# Patient Record
Sex: FEMALE | Race: WHITE | NOT HISPANIC OR LATINO | Employment: FULL TIME | URBAN - METROPOLITAN AREA
[De-identification: names, ages, dates, MRNs, and addresses within clinical notes are randomized per-mention and may not be internally consistent; named-entity substitution may affect disease eponyms.]

---

## 2017-01-18 DIAGNOSIS — E78.2 MIXED HYPERLIPIDEMIA: ICD-10-CM

## 2017-01-18 DIAGNOSIS — I10 ESSENTIAL HYPERTENSION: Primary | ICD-10-CM

## 2017-02-01 ENCOUNTER — OFFICE VISIT (OUTPATIENT)
Dept: FAMILY MEDICINE CLINIC | Facility: CLINIC | Age: 37
End: 2017-02-01

## 2017-02-01 VITALS
HEART RATE: 80 BPM | TEMPERATURE: 98.3 F | OXYGEN SATURATION: 99 % | SYSTOLIC BLOOD PRESSURE: 120 MMHG | BODY MASS INDEX: 30.3 KG/M2 | DIASTOLIC BLOOD PRESSURE: 86 MMHG | HEIGHT: 64 IN | WEIGHT: 177.5 LBS

## 2017-02-01 DIAGNOSIS — K21.9 GASTROESOPHAGEAL REFLUX DISEASE, ESOPHAGITIS PRESENCE NOT SPECIFIED: ICD-10-CM

## 2017-02-01 DIAGNOSIS — I10 BENIGN ESSENTIAL HYPERTENSION: ICD-10-CM

## 2017-02-01 DIAGNOSIS — E66.9 NON MORBID OBESITY, UNSPECIFIED OBESITY TYPE: ICD-10-CM

## 2017-02-01 DIAGNOSIS — I10 BENIGN ESSENTIAL HYPERTENSION: Primary | ICD-10-CM

## 2017-02-01 DIAGNOSIS — G43.809 OTHER TYPE OF MIGRAINE: ICD-10-CM

## 2017-02-01 PROCEDURE — 99214 OFFICE O/P EST MOD 30 MIN: CPT | Performed by: FAMILY MEDICINE

## 2017-02-01 RX ORDER — RIZATRIPTAN BENZOATE 5 MG/1
5 TABLET, ORALLY DISINTEGRATING ORAL ONCE AS NEEDED
Qty: 9 TABLET | Refills: 5 | Status: SHIPPED | OUTPATIENT
Start: 2017-02-01

## 2017-02-01 RX ORDER — HYDROCHLOROTHIAZIDE 12.5 MG/1
12.5 TABLET ORAL DAILY
Qty: 90 TABLET | Refills: 1 | Status: SHIPPED | OUTPATIENT
Start: 2017-02-01 | End: 2017-09-27

## 2017-02-01 NOTE — PROGRESS NOTES
Subjective   Rachael Boyce is a 36 y.o. female.   Chief Complaint   Patient presents with   • Hypertension   • Heartburn   • Migraine       History of Present Illness     #1 hypertension- patient is on HCTZ 12.5 mg a day. She takes it everyday. She reports no side effects. She does not exercise regularly.  She gained 9 pounds from last office visit.  No chest pain, shortness of breath, lower extremity edema, dizziness.    #2 migraine headaches- on  Maxalt as needed.  She was previously on Topamax, but she discontinued it as it made her feel foggy, it affected her memory and spelling.  She reports no change in character or intensity of migraines.  She gets 1-3 migraines a month.  Maxalt helps. No SE.    #3 GERD-patient is on Zantac.  She alternates it with Prevacid.  It controls her symptoms.    The following portions of the patient's history were reviewed and updated as appropriate: allergies, current medications, past medical history, past social history and problem list.    Review of Systems   Constitutional: Negative.    Respiratory: Negative.    Cardiovascular: Negative.          Objective   Wt Readings from Last 3 Encounters:   02/01/17 177 lb 8 oz (80.5 kg)   07/11/16 168 lb (76.2 kg)   03/28/16 169 lb (76.7 kg)      Vitals:    02/01/17 0857   BP: 120/86   Pulse: 80   Temp: 98.3 °F (36.8 °C)   SpO2: 99%     Temp Readings from Last 3 Encounters:   02/01/17 98.3 °F (36.8 °C)   07/11/16 98.2 °F (36.8 °C)   03/28/16 98.5 °F (36.9 °C)     BP Readings from Last 3 Encounters:   02/01/17 120/86   07/11/16 132/70   03/28/16 120/82     Pulse Readings from Last 3 Encounters:   02/01/17 80   07/11/16 83   03/28/16 85       Physical Exam   Constitutional: She is oriented to person, place, and time. She appears well-developed and well-nourished.   HENT:   Head: Normocephalic and atraumatic.   Neck: Neck supple. Carotid bruit is not present. No thyromegaly present.   Cardiovascular: Normal rate, regular rhythm and normal  heart sounds.    Pulmonary/Chest: Effort normal and breath sounds normal.   Neurological: She is alert and oriented to person, place, and time.   Skin: Skin is warm, dry and intact.   Psychiatric: She has a normal mood and affect. Her behavior is normal.       Assessment/Plan   Rachael was seen today for hypertension, heartburn and migraine.    Diagnoses and all orders for this visit:    Benign essential hypertension  -     Thyroid Cascade Profile; Future  -     Vitamin D 25 Hydroxy; Future    Other type of migraine  -     Thyroid Cascade Profile; Future  -     Vitamin D 25 Hydroxy; Future    Gastroesophageal reflux disease, esophagitis presence not specified    Non morbid obesity, unspecified obesity type  -     Thyroid Cascade Profile; Future  -     Vitamin D 25 Hydroxy; Future    Other orders  -     rizatriptan MLT (MAXALT-MLT) 5 MG disintegrating tablet; Take 1 tablet by mouth 1 (One) Time As Needed for migraine for up to 1 dose. May repeat in 2 hours if needed  -     hydrochlorothiazide (HYDRODIURIL) 12.5 MG tablet; Take 1 tablet by mouth Daily.        #1 hypertension-controlled.  Continue current treatment.  Check labs today.  Start exercise at least 30 minutes a day, 5 days a week.  Follow-up in 6 months.      #2 migraine headaches-continue current treatment.  Follow-up in 6 months, or sooner if problems.    #3 GERD-controlled.

## 2017-02-02 LAB
25(OH)D3+25(OH)D2 SERPL-MCNC: 15.5 NG/ML (ref 30–100)
ALBUMIN SERPL-MCNC: 4.5 G/DL (ref 3.5–5.2)
ALBUMIN/GLOB SERPL: 1.7 G/DL
ALP SERPL-CCNC: 57 U/L (ref 39–117)
ALT SERPL-CCNC: 34 U/L (ref 1–33)
AST SERPL-CCNC: 26 U/L (ref 1–32)
BILIRUB SERPL-MCNC: 0.3 MG/DL (ref 0.1–1.2)
BUN SERPL-MCNC: 17 MG/DL (ref 6–20)
BUN/CREAT SERPL: 18.1 (ref 7–25)
CALCIUM SERPL-MCNC: 9.9 MG/DL (ref 8.6–10.5)
CHLORIDE SERPL-SCNC: 101 MMOL/L (ref 98–107)
CHOLEST SERPL-MCNC: 168 MG/DL (ref 0–200)
CO2 SERPL-SCNC: 24.5 MMOL/L (ref 22–29)
CREAT SERPL-MCNC: 0.94 MG/DL (ref 0.57–1)
GLOBULIN SER CALC-MCNC: 2.6 GM/DL
GLUCOSE SERPL-MCNC: 88 MG/DL (ref 65–99)
HDLC SERPL-MCNC: 47 MG/DL (ref 40–60)
LDLC SERPL CALC-MCNC: 104 MG/DL (ref 0–100)
LDLC/HDLC SERPL: 2.2 {RATIO}
POTASSIUM SERPL-SCNC: 4.4 MMOL/L (ref 3.5–5.2)
PROT SERPL-MCNC: 7.1 G/DL (ref 6–8.5)
SODIUM SERPL-SCNC: 140 MMOL/L (ref 136–145)
TRIGL SERPL-MCNC: 87 MG/DL (ref 0–150)
TSH SERPL DL<=0.005 MIU/L-ACNC: 1.43 UIU/ML (ref 0.45–4.5)
VLDLC SERPL CALC-MCNC: 17.4 MG/DL (ref 5–40)

## 2017-08-29 ENCOUNTER — OFFICE VISIT (OUTPATIENT)
Dept: OBSTETRICS AND GYNECOLOGY | Facility: CLINIC | Age: 37
End: 2017-08-29

## 2017-08-29 VITALS
SYSTOLIC BLOOD PRESSURE: 129 MMHG | BODY MASS INDEX: 31.62 KG/M2 | DIASTOLIC BLOOD PRESSURE: 84 MMHG | HEART RATE: 76 BPM | WEIGHT: 185.2 LBS | HEIGHT: 64 IN

## 2017-08-29 DIAGNOSIS — N89.8 VAGINAL ODOR: Primary | ICD-10-CM

## 2017-08-29 DIAGNOSIS — N94.3 PREMENSTRUAL SYNDROME: ICD-10-CM

## 2017-08-29 DIAGNOSIS — Z01.419 ENCOUNTER FOR GYNECOLOGICAL EXAMINATION WITHOUT ABNORMAL FINDING: ICD-10-CM

## 2017-08-29 PROCEDURE — 99395 PREV VISIT EST AGE 18-39: CPT | Performed by: OBSTETRICS & GYNECOLOGY

## 2017-08-29 RX ORDER — CETIRIZINE HYDROCHLORIDE 10 MG/1
10 TABLET ORAL DAILY
COMMUNITY

## 2017-08-29 NOTE — PROGRESS NOTES
Subjective   Rachael Boyce is a 37 y.o. female  1, Para 1 AB 0, Living 1.  Last annual 1 year, last pap 1 year, last mammogram never, last colonoscopy never.  Cc: Annual exam  History of Present Illness  Patient is complaining some mood swings, decreased libido mostly associated with menses  Patient is also complaining of vaginal odor-nondescript  The following portions of the patient's history were reviewed and updated as appropriate: allergies, current medications, past family history, past medical history, past social history, past surgical history and problem list.    Review of Systems   Genitourinary: Positive for vaginal discharge.        Decreased libido, mood swings   All other systems reviewed and are negative.        Past Medical History:   Diagnosis Date   • Abnormal Pap smear of cervix     With positive human papillomavirus deoxyribonu   • Depression with anxiety    • Hypertension    • Hypertension in pregnancy    • Migraine headache without aura    • Vitamin D deficiency      Menstrual History:  OB History      Para Term  AB TAB SAB Ectopic Multiple Living    1 1 1       1         Menarche age: 11  Patient's last menstrual period was 2017.  Period Cycle (Days): 28  Period Duration (Days): 5-7  Period Pattern: Regular  Menstrual Flow: Moderate  Menstrual Control: Tampon  Menstrual Control Change Freq (Hours): 8  Dysmenorrhea: None  Dysmenorrhea Symptoms: Headache    Past Surgical History:   Procedure Laterality Date   •  SECTION       OB History      Para Term  AB TAB SAB Ectopic Multiple Living    1 1 1       1        Family History   Problem Relation Age of Onset   • Heart disease Mother    • Hypertension Father    • Alcohol abuse Maternal Grandmother    • Stroke Maternal Grandmother      Ischemic stroke   • Stroke Paternal Grandmother      Ischemic stroke   • No Known Problems Daughter      History   Smoking Status   • Never Smoker   Smokeless  "Tobacco   • Never Used     History   Alcohol Use No     Health Maintenance   Topic Date Due   • TDAP/TD VACCINES (1 - Tdap) 05/02/1998   • PAP SMEAR  03/28/2016   • INFLUENZA VACCINE  09/01/2017   • LIPID PANEL  02/01/2018       Current Outpatient Prescriptions:   •  cetirizine (zyrTEC) 10 MG tablet, Take 10 mg by mouth Daily., Disp: , Rfl:   •  hydrochlorothiazide (HYDRODIURIL) 12.5 MG tablet, Take 1 tablet by mouth Daily., Disp: 90 tablet, Rfl: 1  •  rizatriptan MLT (MAXALT-MLT) 5 MG disintegrating tablet, Take 1 tablet by mouth 1 (One) Time As Needed for migraine for up to 1 dose. May repeat in 2 hours if needed, Disp: 9 tablet, Rfl: 5  •  VENTOLIN  (90 BASE) MCG/ACT inhaler, , Disp: , Rfl:   Sexual History:active  STD:HPV       Objective   Vitals:    08/29/17 1057   BP: 129/84   Pulse: 76   Weight: 185 lb 3.2 oz (84 kg)   Height: 64\" (162.6 cm)     Physical Exam   Constitutional: She is oriented to person, place, and time. She appears well-developed and well-nourished.   Obese   HENT:   Head: Normocephalic.   Eyes: Pupils are equal, round, and reactive to light.   Neck: Normal range of motion. No thyromegaly present.   Cardiovascular: Normal rate, regular rhythm, normal heart sounds and intact distal pulses.    Pulmonary/Chest: Effort normal and breath sounds normal. No respiratory distress. She exhibits no tenderness. Right breast exhibits no inverted nipple, no mass, no nipple discharge, no skin change and no tenderness. Left breast exhibits no inverted nipple, no mass, no nipple discharge, no skin change and no tenderness. Breasts are symmetrical.   Abdominal: Soft. Bowel sounds are normal. Hernia confirmed negative in the right inguinal area and confirmed negative in the left inguinal area.   Genitourinary: Rectum normal, vagina normal and uterus normal. No breast tenderness or discharge. Pelvic exam was performed with patient supine. There is no rash, tenderness, lesion or injury on the right labia. " There is no rash, tenderness, lesion or injury on the left labia. Uterus is not enlarged and not tender. Cervix exhibits no motion tenderness, no discharge and no friability. Right adnexum displays no mass, no tenderness and no fullness. Left adnexum displays no mass, no tenderness and no fullness.   Lymphadenopathy:     She has no cervical adenopathy.        Right: No inguinal adenopathy present.        Left: No inguinal adenopathy present.   Neurological: She is alert and oriented to person, place, and time. She has normal reflexes.   Skin: Skin is warm and dry.   Psychiatric: She has a normal mood and affect. Her behavior is normal. Judgment and thought content normal.   Vitals reviewed.        Assessment/Plan   Rachael was seen today for gynecologic exam.    Diagnoses and all orders for this visit:    Vaginal odor  -     NuSwab VG+    Encounter for gynecological examination without abnormal finding    Premenstrual syndrome  Comments:  This use of birth control pills for regulation.  She will consider her alternatives    Counseled about birth control, menses, premenstrual symptoms, breast self-examination

## 2017-09-02 LAB
A VAGINAE DNA VAG QL NAA+PROBE: NORMAL SCORE
BVAB2 DNA VAG QL NAA+PROBE: NORMAL SCORE
C ALBICANS DNA VAG QL NAA+PROBE: NEGATIVE
C GLABRATA DNA VAG QL NAA+PROBE: NEGATIVE
C TRACH RRNA SPEC QL NAA+PROBE: NEGATIVE
MEGA1 DNA VAG QL NAA+PROBE: NORMAL SCORE
N GONORRHOEA RRNA SPEC QL NAA+PROBE: NEGATIVE
T VAGINALIS RRNA SPEC QL NAA+PROBE: NEGATIVE

## 2017-09-27 ENCOUNTER — OFFICE VISIT (OUTPATIENT)
Dept: INTERNAL MEDICINE | Facility: CLINIC | Age: 37
End: 2017-09-27

## 2017-09-27 ENCOUNTER — TELEPHONE (OUTPATIENT)
Dept: OBSTETRICS AND GYNECOLOGY | Facility: CLINIC | Age: 37
End: 2017-09-27

## 2017-09-27 VITALS
DIASTOLIC BLOOD PRESSURE: 74 MMHG | BODY MASS INDEX: 30.9 KG/M2 | OXYGEN SATURATION: 99 % | TEMPERATURE: 98.2 F | SYSTOLIC BLOOD PRESSURE: 126 MMHG | WEIGHT: 181 LBS | HEIGHT: 64 IN | HEART RATE: 75 BPM

## 2017-09-27 DIAGNOSIS — R32 INCONTINENCE: Primary | ICD-10-CM

## 2017-09-27 DIAGNOSIS — K59.00 CONSTIPATION, UNSPECIFIED CONSTIPATION TYPE: ICD-10-CM

## 2017-09-27 DIAGNOSIS — R35.0 FREQUENCY OF URINATION: ICD-10-CM

## 2017-09-27 DIAGNOSIS — I10 BENIGN ESSENTIAL HYPERTENSION: ICD-10-CM

## 2017-09-27 LAB
BILIRUB BLD-MCNC: NEGATIVE MG/DL
CLARITY, POC: CLEAR
COLOR UR: YELLOW
GLUCOSE UR STRIP-MCNC: NEGATIVE MG/DL
KETONES UR QL: NEGATIVE
LEUKOCYTE EST, POC: NEGATIVE
NITRITE UR-MCNC: NEGATIVE MG/ML
PH UR: 5 [PH] (ref 5–8)
PROT UR STRIP-MCNC: NEGATIVE MG/DL
RBC # UR STRIP: NEGATIVE /UL
SP GR UR: 1.02 (ref 1–1.03)
UROBILINOGEN UR QL: NORMAL

## 2017-09-27 PROCEDURE — 90471 IMMUNIZATION ADMIN: CPT | Performed by: FAMILY MEDICINE

## 2017-09-27 PROCEDURE — 99214 OFFICE O/P EST MOD 30 MIN: CPT | Performed by: FAMILY MEDICINE

## 2017-09-27 PROCEDURE — 81003 URINALYSIS AUTO W/O SCOPE: CPT | Performed by: FAMILY MEDICINE

## 2017-09-27 PROCEDURE — 90686 IIV4 VACC NO PRSV 0.5 ML IM: CPT | Performed by: FAMILY MEDICINE

## 2017-09-27 RX ORDER — LISINOPRIL 10 MG/1
10 TABLET ORAL DAILY
Qty: 30 TABLET | Refills: 1 | Status: SHIPPED | OUTPATIENT
Start: 2017-09-27 | End: 2017-10-25 | Stop reason: SDUPTHER

## 2017-09-27 NOTE — TELEPHONE ENCOUNTER
----- Message from Terell Pedersen MD sent at 9/27/2017 12:47 PM EDT -----  Tell her that her cultures were negative  ----- Message -----     From: Gertrudis Scanlon     Sent: 9/27/2017   9:51 AM       To: Terell Pedersen MD    Pt requesting lab results from 8/30/2017, please advise.

## 2017-09-27 NOTE — PROGRESS NOTES
Subjective   Rachael Boyce is a 37 y.o. female.   Chief Complaint   Patient presents with   • Urine Leakage   • Constipation   • Hypertension       History of Present Illness     #1 Hypertension/ #2 urinary frequency-patient is on HCTZ 12.5 mg a day.  She takes it for years.  She is allergic to atenolol and nifedipine.  Patient tolerated HCTZ without problems, but over last few months she has increased urinary frequency.  No dysuria.  It is getting difficult to function with frequent urinations.  Patient also has urinary incontinence.  It started after she gave birth to her daughter.  It seems to be getting worse now.  It is stress incontinence and times patient is leaking urine, not being able to make it on time to the restroom.     #2 constipation-for years.  Patient used MiraLAX and it did not help much.  She uses suppositories over the counter which are not helping much.  She has a bowel movement every day, but she has to strain and it is very hard.  She is trying to increase vegetables in her diet.  She feels that she is dehydrated due to HCTZ.      The following portions of the patient's history were reviewed and updated as appropriate: allergies, current medications, past family history, past medical history, past social history, past surgical history and problem list.    Review of Systems   Constitutional: Negative.    Respiratory: Negative.    Cardiovascular: Negative.    Gastrointestinal: Positive for constipation.   Genitourinary: Positive for frequency. Negative for dysuria.         Objective   Wt Readings from Last 3 Encounters:   09/27/17 181 lb (82.1 kg)   08/29/17 185 lb 3.2 oz (84 kg)   02/01/17 177 lb 8 oz (80.5 kg)      Vitals:    09/27/17 1114   BP: 126/74   Pulse: 75   Temp: 98.2 °F (36.8 °C)   SpO2: 99%     Temp Readings from Last 3 Encounters:   09/27/17 98.2 °F (36.8 °C)   02/01/17 98.3 °F (36.8 °C)   07/11/16 98.2 °F (36.8 °C)     BP Readings from Last 3 Encounters:   09/27/17 126/74    08/29/17 129/84   02/01/17 120/86     Pulse Readings from Last 3 Encounters:   09/27/17 75   08/29/17 76   02/01/17 80       Physical Exam   Constitutional: She is oriented to person, place, and time. She appears well-developed and well-nourished.   HENT:   Head: Normocephalic and atraumatic.   Neck: Neck supple. Carotid bruit is not present. No thyromegaly present.   Cardiovascular: Normal rate, regular rhythm and normal heart sounds.    Pulmonary/Chest: Effort normal and breath sounds normal.   Abdominal: Soft. She exhibits no distension and no mass. There is no tenderness.   Neurological: She is alert and oriented to person, place, and time.   Skin: Skin is warm, dry and intact.   Psychiatric: She has a normal mood and affect. Her behavior is normal.       Assessment/Plan   Rachael was seen today for urine leakage, constipation and hypertension.    Diagnoses and all orders for this visit:    Incontinence  -     POCT urinalysis dipstick, automated    Benign essential hypertension    Constipation, unspecified constipation type    Frequency of urination    Other orders  -     lisinopril (PRINIVIL,ZESTRIL) 10 MG tablet; Take 1 tablet by mouth Daily.        #1 hypertension-we are stopping HCTZ.  We are starting lisinopril at 10 mg a day.  Side effects of cough and angioedema discussed.  Follow-up in one month.      #2 urinary frequency-new and uncontrolled problem, urine dip is negative.  We are stopping HCTZ.  Follow-up in one month.      #3 constipation-patient will use milk of magnesia for 1 week, then stool softener, more fruits and more fluids.  Follow-up in one month.

## 2017-10-25 ENCOUNTER — OFFICE VISIT (OUTPATIENT)
Dept: INTERNAL MEDICINE | Facility: CLINIC | Age: 37
End: 2017-10-25

## 2017-10-25 VITALS
HEART RATE: 80 BPM | TEMPERATURE: 98.2 F | OXYGEN SATURATION: 98 % | DIASTOLIC BLOOD PRESSURE: 80 MMHG | SYSTOLIC BLOOD PRESSURE: 120 MMHG | WEIGHT: 183 LBS | HEIGHT: 64 IN | BODY MASS INDEX: 31.24 KG/M2

## 2017-10-25 DIAGNOSIS — I10 BENIGN ESSENTIAL HYPERTENSION: Primary | ICD-10-CM

## 2017-10-25 PROCEDURE — 99213 OFFICE O/P EST LOW 20 MIN: CPT | Performed by: FAMILY MEDICINE

## 2017-10-25 RX ORDER — LISINOPRIL 10 MG/1
10 TABLET ORAL DAILY
Qty: 90 TABLET | Refills: 1 | Status: SHIPPED | OUTPATIENT
Start: 2017-10-25

## 2017-10-25 NOTE — PROGRESS NOTES
Vivian Boyce is a 37 y.o. female.   Chief Complaint   Patient presents with   • Hypertension       History of Present Illness     #1 Hypertension-at last office visit we stopped HCTZ due to increased frequent off urinations.  We started lisinopril 10 mg a day.  Patient takes it everyday.  She has dry cough which is mild.  It does not bother her enough to stop the medication.  She has no chest pain, no shortness of breath, no dizziness.  She is allergic to atenolol and nifedipine.  Patient did not tolerated HCTZ.    The following portions of the patient's history were reviewed and updated as appropriate: allergies, current medications, past medical history, past social history and problem list.    Review of Systems   Constitutional: Negative.    Respiratory: Negative.    Cardiovascular: Negative.          Objective   Wt Readings from Last 3 Encounters:   10/25/17 183 lb (83 kg)   09/27/17 181 lb (82.1 kg)   08/29/17 185 lb 3.2 oz (84 kg)      Vitals:    10/25/17 1124   BP: 120/80   Pulse: 80   Temp: 98.2 °F (36.8 °C)   SpO2: 98%     Temp Readings from Last 3 Encounters:   10/25/17 98.2 °F (36.8 °C)   09/27/17 98.2 °F (36.8 °C)   02/01/17 98.3 °F (36.8 °C)     BP Readings from Last 3 Encounters:   10/25/17 120/80   09/27/17 126/74   08/29/17 129/84     Pulse Readings from Last 3 Encounters:   10/25/17 80   09/27/17 75   08/29/17 76       Physical Exam   Constitutional: She is oriented to person, place, and time. She appears well-developed and well-nourished.   HENT:   Head: Normocephalic and atraumatic.   Neck: Neck supple. Carotid bruit is not present. No thyromegaly present.   Cardiovascular: Normal rate, regular rhythm and normal heart sounds.    Pulmonary/Chest: Effort normal and breath sounds normal.   Neurological: She is alert and oriented to person, place, and time.   Skin: Skin is warm, dry and intact.   Psychiatric: She has a normal mood and affect. Her behavior is normal.       Assessment/Plan    Rachael was seen today for hypertension.    Diagnoses and all orders for this visit:    Benign essential hypertension    Other orders  -     lisinopril (PRINIVIL,ZESTRIL) 10 MG tablet; Take 1 tablet by mouth Daily.        #1 hypertension-controlled.  Continue current treatment.  Follow-up in 6 months, or sooner if any problems.

## 2025-07-10 ENCOUNTER — OFFICE VISIT (OUTPATIENT)
Dept: FAMILY MEDICINE CLINIC | Facility: CLINIC | Age: 45
End: 2025-07-10
Payer: COMMERCIAL

## 2025-07-10 VITALS
BODY MASS INDEX: 28.51 KG/M2 | HEART RATE: 82 BPM | WEIGHT: 167 LBS | DIASTOLIC BLOOD PRESSURE: 78 MMHG | SYSTOLIC BLOOD PRESSURE: 112 MMHG | OXYGEN SATURATION: 100 % | HEIGHT: 64 IN | TEMPERATURE: 98.4 F | RESPIRATION RATE: 17 BRPM

## 2025-07-10 DIAGNOSIS — I10 BENIGN ESSENTIAL HYPERTENSION: Primary | ICD-10-CM

## 2025-07-10 DIAGNOSIS — Z11.59 NEED FOR HEPATITIS C SCREENING TEST: ICD-10-CM

## 2025-07-10 DIAGNOSIS — Z13.0 SCREENING FOR DEFICIENCY ANEMIA: ICD-10-CM

## 2025-07-10 DIAGNOSIS — Z12.31 ENCOUNTER FOR SCREENING MAMMOGRAM FOR MALIGNANT NEOPLASM OF BREAST: ICD-10-CM

## 2025-07-10 DIAGNOSIS — K21.9 GASTROESOPHAGEAL REFLUX DISEASE WITHOUT ESOPHAGITIS: ICD-10-CM

## 2025-07-10 DIAGNOSIS — Z13.220 SCREENING FOR HYPERLIPIDEMIA: ICD-10-CM

## 2025-07-10 DIAGNOSIS — E83.42 HYPOMAGNESEMIA: ICD-10-CM

## 2025-07-10 DIAGNOSIS — E55.9 VITAMIN D DEFICIENCY: ICD-10-CM

## 2025-07-10 PROCEDURE — 3078F DIAST BP <80 MM HG: CPT | Performed by: NURSE PRACTITIONER

## 2025-07-10 PROCEDURE — 3074F SYST BP LT 130 MM HG: CPT | Performed by: NURSE PRACTITIONER

## 2025-07-10 PROCEDURE — 1126F AMNT PAIN NOTED NONE PRSNT: CPT | Performed by: NURSE PRACTITIONER

## 2025-07-10 PROCEDURE — 99204 OFFICE O/P NEW MOD 45 MIN: CPT | Performed by: NURSE PRACTITIONER

## 2025-07-10 RX ORDER — SUCRALFATE 1 G/1
1 TABLET ORAL 4 TIMES DAILY
COMMUNITY
Start: 2024-12-17 | End: 2025-12-17

## 2025-07-10 RX ORDER — OLMESARTAN MEDOXOMIL 5 MG/1
10 TABLET ORAL DAILY
COMMUNITY
Start: 2025-05-05 | End: 2025-07-14 | Stop reason: SDUPTHER

## 2025-07-10 RX ORDER — BUPROPION HYDROCHLORIDE 300 MG/1
300 TABLET ORAL DAILY
COMMUNITY
Start: 2025-07-02

## 2025-07-10 RX ORDER — ESOMEPRAZOLE MAGNESIUM 40 MG/1
40 CAPSULE, DELAYED RELEASE ORAL DAILY
COMMUNITY
Start: 2025-06-24 | End: 2025-07-14 | Stop reason: SDUPTHER

## 2025-07-10 RX ORDER — ASPIRIN 81 MG/1
81 TABLET ORAL DAILY
COMMUNITY

## 2025-07-10 RX ORDER — FLUTICASONE PROPIONATE 50 MCG
1 SPRAY, SUSPENSION (ML) NASAL DAILY
COMMUNITY

## 2025-07-10 NOTE — PROGRESS NOTES
Chief Complaint  Establish Care and Med Refill    Subjective        Rachael Boyce presents to [facility]  History of Present Illness    History of Present Illness  The patient presents for evaluation of hypertension, GERD, depression, and Raynaud's disease.    The primary reason for this visit is to address ongoing stomach issues and associated symptoms. She is currently on Benicar 10 mg daily for hypertension but does not monitor her blood pressure at home. She reports that her blood pressure has been well-controlled recently.    She has a history of Raynaud's disease, which primarily affects her during cold weather, causing the tips of her fingers to turn purple and the lower parts to appear purplish.    She experiences severe stomach issues if she misses her omeprazole dose for two consecutive days. Symptoms include intense burning pain in the epigastric area, uncontrolled GERD, nausea, and bloating. She also has hemorrhoids and difficulty with bowel movements. Despite normal EGD and colonoscopy results from 11/2024, she suspects an underlying issue at a microscopic or histamine level, or possibly related to her gallbladder. She has not followed up with Dr. Richard since her procedure. She has been bitten by ticks several times and wonders if Lyme disease could be a factor. She also experiences joint pain in her right knee, which she initially attributed to weight loss and increased activity. She has considered DNA marker testing. Her symptoms are often accompanied by migraines, leading her to suspect food allergies. She takes Carafate as needed, which provides relief but requires 4 to 5 tablets per day. She has found Flexeril helpful in managing stress-related symptoms.    She is currently taking Wellbutrin for depression, which she started around the time she left her previous job due to dissatisfaction. She has been making efforts to reduce sugar intake and maintain regular gym  "attendance. She declined the addition of fluoxetine to her treatment regimen due to concerns about potential sexual side effects, as she is already experiencing low libido due to perimenopause.    She is on Maxalt for migraines, which she usually does not have to use unless it is the beginning of her period or the end of her period.    She is due for a mammogram, with her last one conducted in 2020. She had an abnormal mammogram in the past, which was later determined to be a fluid-filled cyst.    FAMILY HISTORY  Her mother has hypertrophic cardiomyopathy and is in a heart failure clinic.  Her father had his gallbladder removed due to similar symptoms.  Her son was diagnosed with ulcerative colitis.       Objective   Vital Signs:  /78   Pulse 82   Temp 98.4 °F (36.9 °C) (Infrared)   Resp 17   Ht 162.6 cm (64\")   Wt 75.8 kg (167 lb)   SpO2 100%   BMI 28.67 kg/m²   Estimated body mass index is 28.67 kg/m² as calculated from the following:    Height as of this encounter: 162.6 cm (64\").    Weight as of this encounter: 75.8 kg (167 lb).          Physical Exam  Vitals and nursing note reviewed.   HENT:      Head: Normocephalic.      Nose: Nose normal.   Eyes:      Pupils: Pupils are equal, round, and reactive to light.   Cardiovascular:      Rate and Rhythm: Normal rate and regular rhythm.      Pulses: Normal pulses.      Heart sounds: Normal heart sounds.   Pulmonary:      Effort: Pulmonary effort is normal. No respiratory distress.      Breath sounds: Normal breath sounds. No wheezing or rales.   Abdominal:      General: Bowel sounds are normal. There is no distension.      Tenderness: There is no abdominal tenderness.   Musculoskeletal:         General: No swelling.      Cervical back: Neck supple.      Right lower leg: No edema.      Left lower leg: No edema.   Skin:     General: Skin is warm and dry.   Neurological:      Mental Status: She is alert and oriented to person, place, and time.   Psychiatric: "         Mood and Affect: Mood normal.          Physical Exam         Result Review :           Results  Labs   - Liver Enzymes: Elevated in the past    Imaging   - CT scan of abdomen: 01/2025, No abnormalities    Diagnostic Testing   - EGD: 11/2024, Normal   - Colonoscopy: 11/2024, Normal              Assessment and Plan   Diagnoses and all orders for this visit:    1. Benign essential hypertension (Primary)    2. Need for hepatitis C screening test  -     Hepatitis C Antibody    3. Encounter for screening mammogram for malignant neoplasm of breast  -     UA / M With / Rflx Culture([facility] ONLY) - Urine, Clean Catch    4. Screening for hyperlipidemia  -     Comprehensive Metabolic Panel  -     Lipid Panel    5. Vitamin D deficiency  -     Vitamin D,25-Hydroxy    6. Screening for deficiency anemia  -     CBC & Differential    7. Hypomagnesemia  -     Magnesium    8. Gastroesophageal reflux disease without esophagitis  -     Ambulatory Referral to Gastroenterology        Assessment & Plan  1. Hypertension.  - Blood pressure readings are within the normal range.  - Currently on Benicar 10 mg daily.  - Prescription refill for Benicar will be provided.  - Advised to monitor blood pressure at home with a new machine.    2. Raynaud's disease.  - Symptoms are manageable and primarily triggered by cold weather.  - Reports fingers turning pale and purplish in cold conditions.  - No new physical exam findings or test results discussed.  - No changes in management at this time.    3. Gastroesophageal reflux disease (GERD).  - Experiences severe symptoms if omeprazole is missed for 2 days, including burning pain, nausea, and bloating.  - Uses Carafate as needed for symptom relief.  - Referral to [facility], a GI specialist, will be made for further evaluation and management.  - Basic labs will be ordered to assess overall health status.    4. Depression.  - Currently on Wellbutrin, which has been effective in improving symptoms.  -  Declined the addition of fluoxetine due to concerns about side effects.  - Reports improved motivation and ability to engage in activities.  - No changes in medication management at this time.    5. Migraines.  - Uses Maxalt for migraines, typically at the beginning or end of the menstrual cycle.  - Discussed potential hormonal influence on migraines.  - No new physical exam findings or test results discussed.  - No changes in management at this time.    6. Health maintenance.  - Overdue for a mammogram, with the last one being in 2020.  - Advised to schedule a mammogram as soon as possible.  - No new physical exam findings or test results discussed.  - Encouraged to follow up on routine health screenings.    Follow-up  - A follow-up visit is scheduled in 3 months.            Follow Up   No follow-ups on file.  Patient was given instructions and counseling regarding her condition or for health maintenance advice. Please see specific information pulled into the AVS if appropriate.         Patient or patient representative verbalized consent for the use of Ambient Listening during the visit with  BRANNON Qiu for chart documentation. 7/14/2025  20:21 EDT

## 2025-07-11 LAB
25(OH)D3+25(OH)D2 SERPL-MCNC: 39.2 NG/ML (ref 30–100)
ALBUMIN SERPL-MCNC: 5 G/DL (ref 3.9–4.9)
ALP SERPL-CCNC: 55 IU/L (ref 44–121)
ALT SERPL-CCNC: 18 IU/L (ref 0–32)
APPEARANCE UR: CLEAR
AST SERPL-CCNC: 21 IU/L (ref 0–40)
BACTERIA #/AREA URNS HPF: NORMAL /[HPF]
BASOPHILS # BLD AUTO: 0 X10E3/UL (ref 0–0.2)
BASOPHILS NFR BLD AUTO: 0 %
BILIRUB SERPL-MCNC: 0.3 MG/DL (ref 0–1.2)
BILIRUB UR QL STRIP: NEGATIVE
BUN SERPL-MCNC: 15 MG/DL (ref 6–24)
BUN/CREAT SERPL: 17 (ref 9–23)
CALCIUM SERPL-MCNC: 9.8 MG/DL (ref 8.7–10.2)
CASTS URNS QL MICRO: NORMAL /LPF
CHLORIDE SERPL-SCNC: 103 MMOL/L (ref 96–106)
CHOLEST SERPL-MCNC: 188 MG/DL (ref 100–199)
CO2 SERPL-SCNC: 20 MMOL/L (ref 20–29)
COLOR UR: YELLOW
CREAT SERPL-MCNC: 0.9 MG/DL (ref 0.57–1)
EGFRCR SERPLBLD CKD-EPI 2021: 80 ML/MIN/1.73
EOSINOPHIL # BLD AUTO: 0.1 X10E3/UL (ref 0–0.4)
EOSINOPHIL NFR BLD AUTO: 3 %
EPI CELLS #/AREA URNS HPF: NORMAL /HPF (ref 0–10)
ERYTHROCYTE [DISTWIDTH] IN BLOOD BY AUTOMATED COUNT: 13.3 % (ref 11.7–15.4)
GLOBULIN SER CALC-MCNC: 2 G/DL (ref 1.5–4.5)
GLUCOSE SERPL-MCNC: 84 MG/DL (ref 70–99)
GLUCOSE UR QL STRIP: NEGATIVE
HCT VFR BLD AUTO: 37.5 % (ref 34–46.6)
HCV IGG SERPL QL IA: NON REACTIVE
HDLC SERPL-MCNC: 44 MG/DL
HGB BLD-MCNC: 11.9 G/DL (ref 11.1–15.9)
HGB UR QL STRIP: NEGATIVE
IMM GRANULOCYTES # BLD AUTO: 0 X10E3/UL (ref 0–0.1)
IMM GRANULOCYTES NFR BLD AUTO: 0 %
KETONES UR QL STRIP: NEGATIVE
LDLC SERPL CALC-MCNC: 131 MG/DL (ref 0–99)
LEUKOCYTE ESTERASE UR QL STRIP: NEGATIVE
LYMPHOCYTES # BLD AUTO: 1.6 X10E3/UL (ref 0.7–3.1)
LYMPHOCYTES NFR BLD AUTO: 34 %
MAGNESIUM SERPL-MCNC: 2.2 MG/DL (ref 1.6–2.3)
MCH RBC QN AUTO: 28.1 PG (ref 26.6–33)
MCHC RBC AUTO-ENTMCNC: 31.7 G/DL (ref 31.5–35.7)
MCV RBC AUTO: 88 FL (ref 79–97)
MICRO URNS: NORMAL
MICRO URNS: NORMAL
MONOCYTES # BLD AUTO: 0.4 X10E3/UL (ref 0.1–0.9)
MONOCYTES NFR BLD AUTO: 9 %
NEUTROPHILS # BLD AUTO: 2.5 X10E3/UL (ref 1.4–7)
NEUTROPHILS NFR BLD AUTO: 54 %
NITRITE UR QL STRIP: NEGATIVE
PH UR STRIP: 7 [PH] (ref 5–7.5)
PLATELET # BLD AUTO: 315 X10E3/UL (ref 150–450)
POTASSIUM SERPL-SCNC: 4.5 MMOL/L (ref 3.5–5.2)
PROT SERPL-MCNC: 7 G/DL (ref 6–8.5)
PROT UR QL STRIP: NEGATIVE
RBC # BLD AUTO: 4.24 X10E6/UL (ref 3.77–5.28)
RBC #/AREA URNS HPF: NORMAL /HPF (ref 0–2)
SODIUM SERPL-SCNC: 139 MMOL/L (ref 134–144)
SP GR UR STRIP: 1.01 (ref 1–1.03)
TRIGL SERPL-MCNC: 70 MG/DL (ref 0–149)
URINALYSIS REFLEX: NORMAL
UROBILINOGEN UR STRIP-MCNC: 0.2 MG/DL (ref 0.2–1)
VLDLC SERPL CALC-MCNC: 13 MG/DL (ref 5–40)
WBC # BLD AUTO: 4.7 X10E3/UL (ref 3.4–10.8)
WBC #/AREA URNS HPF: NORMAL /HPF (ref 0–5)

## 2025-07-14 RX ORDER — ESOMEPRAZOLE MAGNESIUM 40 MG/1
40 CAPSULE, DELAYED RELEASE ORAL DAILY
Qty: 90 CAPSULE | Refills: 1 | Status: SHIPPED | OUTPATIENT
Start: 2025-07-14

## 2025-07-14 RX ORDER — RIZATRIPTAN BENZOATE 5 MG/1
5 TABLET, ORALLY DISINTEGRATING ORAL ONCE AS NEEDED
Qty: 9 TABLET | Refills: 5 | Status: SHIPPED | OUTPATIENT
Start: 2025-07-14

## 2025-07-14 RX ORDER — OLMESARTAN MEDOXOMIL 5 MG/1
10 TABLET ORAL DAILY
Qty: 60 TABLET | Refills: 5 | Status: SHIPPED | OUTPATIENT
Start: 2025-07-14 | End: 2026-01-10